# Patient Record
Sex: FEMALE | URBAN - NONMETROPOLITAN AREA
[De-identification: names, ages, dates, MRNs, and addresses within clinical notes are randomized per-mention and may not be internally consistent; named-entity substitution may affect disease eponyms.]

---

## 2021-11-02 ENCOUNTER — APPOINTMENT (RX ONLY)
Dept: URBAN - NONMETROPOLITAN AREA CLINIC 22 | Facility: CLINIC | Age: 66
Setting detail: DERMATOLOGY
End: 2021-11-02

## 2021-11-02 DIAGNOSIS — L72.8 OTHER FOLLICULAR CYSTS OF THE SKIN AND SUBCUTANEOUS TISSUE: ICD-10-CM | Status: INADEQUATELY CONTROLLED

## 2021-11-02 PROBLEM — Z71.89 OTHER SPECIFIED COUNSELING: Status: ACTIVE | Noted: 2021-11-02

## 2021-11-02 PROCEDURE — ? OTHER

## 2021-11-02 PROCEDURE — ? COUNSELING

## 2021-11-02 PROCEDURE — 99203 OFFICE O/P NEW LOW 30 MIN: CPT

## 2021-11-02 PROCEDURE — ? SUNSCREEN RECOMMENDATIONS

## 2021-11-02 ASSESSMENT — LOCATION SIMPLE DESCRIPTION DERM: LOCATION SIMPLE: RIGHT PERINEUM

## 2021-11-02 ASSESSMENT — LOCATION DETAILED DESCRIPTION DERM: LOCATION DETAILED: RIGHT PERINEUM

## 2021-11-02 ASSESSMENT — LOCATION ZONE DERM: LOCATION ZONE: PERINEUM

## 2021-11-02 NOTE — PROCEDURE: OTHER
Render Risk Assessment In Note?: no
Detail Level: Simple
Note Text (......Xxx Chief Complaint.): This diagnosis correlates with the
Other (Free Text): Cancer screening recommendations:\\n- Colon cancer: Colonoscopy every 10 years starting at age 50\\n- Lung cancer: Low dose CT if 55-80 years, a 30 pack-year hx, and current smoker or quit <15 years ago\\n- For women, Cervical cancer: (21-29y) PAP smear every 3 years; (30-65y) PAP with HPV testing every 5 years\\n- For women, Breast cancer: Mammogram every 1-2 years starting at age 40
Other (Free Text): Recommend to come back to have it removed

## 2023-05-19 ENCOUNTER — APPOINTMENT (RX ONLY)
Dept: URBAN - NONMETROPOLITAN AREA CLINIC 19 | Facility: CLINIC | Age: 68
Setting detail: DERMATOLOGY
End: 2023-05-19

## 2023-05-19 VITALS — WEIGHT: 174 LBS | HEIGHT: 69 IN

## 2023-05-19 DIAGNOSIS — Z71.89 OTHER SPECIFIED COUNSELING: ICD-10-CM

## 2023-05-19 DIAGNOSIS — L57.8 OTHER SKIN CHANGES DUE TO CHRONIC EXPOSURE TO NONIONIZING RADIATION: ICD-10-CM

## 2023-05-19 DIAGNOSIS — L81.4 OTHER MELANIN HYPERPIGMENTATION: ICD-10-CM

## 2023-05-19 DIAGNOSIS — D22 MELANOCYTIC NEVI: ICD-10-CM

## 2023-05-19 DIAGNOSIS — L82.1 OTHER SEBORRHEIC KERATOSIS: ICD-10-CM

## 2023-05-19 DIAGNOSIS — L73.8 OTHER SPECIFIED FOLLICULAR DISORDERS: ICD-10-CM

## 2023-05-19 DIAGNOSIS — D485 NEOPLASM OF UNCERTAIN BEHAVIOR OF SKIN: ICD-10-CM

## 2023-05-19 PROBLEM — D48.5 NEOPLASM OF UNCERTAIN BEHAVIOR OF SKIN: Status: ACTIVE | Noted: 2023-05-19

## 2023-05-19 PROBLEM — D22.5 MELANOCYTIC NEVI OF TRUNK: Status: ACTIVE | Noted: 2023-05-19

## 2023-05-19 PROBLEM — D22.72 MELANOCYTIC NEVI OF LEFT LOWER LIMB, INCLUDING HIP: Status: ACTIVE | Noted: 2023-05-19

## 2023-05-19 PROCEDURE — ? COUNSELING

## 2023-05-19 PROCEDURE — ? PRODUCT LINE (ANTI-AGING)

## 2023-05-19 PROCEDURE — 11102 TANGNTL BX SKIN SINGLE LES: CPT

## 2023-05-19 PROCEDURE — ? FULL BODY SKIN EXAM

## 2023-05-19 PROCEDURE — ? PHOTO-DOCUMENTATION

## 2023-05-19 PROCEDURE — 99213 OFFICE O/P EST LOW 20 MIN: CPT | Mod: 25

## 2023-05-19 PROCEDURE — ? BIOPSY BY SHAVE METHOD

## 2023-05-19 PROCEDURE — ? SUNSCREEN RECOMMENDATIONS

## 2023-05-19 ASSESSMENT — LOCATION SIMPLE DESCRIPTION DERM
LOCATION SIMPLE: LEFT PRETIBIAL REGION
LOCATION SIMPLE: UPPER BACK
LOCATION SIMPLE: LEFT CHEEK
LOCATION SIMPLE: CHEST
LOCATION SIMPLE: LEFT UPPER BACK
LOCATION SIMPLE: RIGHT UPPER BACK
LOCATION SIMPLE: RIGHT CHEEK

## 2023-05-19 ASSESSMENT — LOCATION DETAILED DESCRIPTION DERM
LOCATION DETAILED: LEFT INFERIOR MEDIAL UPPER BACK
LOCATION DETAILED: RIGHT CENTRAL MALAR CHEEK
LOCATION DETAILED: RIGHT LATERAL MALAR CHEEK
LOCATION DETAILED: SUPERIOR THORACIC SPINE
LOCATION DETAILED: LEFT MEDIAL SUPERIOR CHEST
LOCATION DETAILED: LEFT LATERAL MALAR CHEEK
LOCATION DETAILED: LEFT PROXIMAL PRETIBIAL REGION
LOCATION DETAILED: RIGHT MEDIAL UPPER BACK

## 2023-05-19 ASSESSMENT — LOCATION ZONE DERM
LOCATION ZONE: TRUNK
LOCATION ZONE: FACE
LOCATION ZONE: LEG

## 2023-05-19 NOTE — PROCEDURE: PRODUCT LINE (ANTI-AGING)
Product 10 Price (In Dollars - Numeric Only, No Special Characters Or $): 0.00
Product 32 Units: 0
Send Charges To Patient Encounter: Yes
Name Of Product 1: 5x retinol
Product 1 Price (In Dollars - Numeric Only, No Special Characters Or $): 92.40
Product 1 Units: 1
Detail Level: Zone
Risk Of Complication Category: No MDM
Assigning Risk Information: Per AMA, level of risk is based upon consequences of the problem(s) addressed at the encounter when appropriately treated. Risk also includes medical decision making related to the need to initiate or forego further testing, treatment and/or hospitalization. Over the counter medication are assigned a risk level of low. Prescription medication management is assigned a risk level of moderate.

## 2024-05-23 ENCOUNTER — APPOINTMENT (RX ONLY)
Dept: URBAN - NONMETROPOLITAN AREA CLINIC 19 | Facility: CLINIC | Age: 69
Setting detail: DERMATOLOGY
End: 2024-05-23

## 2024-05-23 DIAGNOSIS — L72.0 EPIDERMAL CYST: ICD-10-CM

## 2024-05-23 DIAGNOSIS — Z71.89 OTHER SPECIFIED COUNSELING: ICD-10-CM

## 2024-05-23 DIAGNOSIS — L81.4 OTHER MELANIN HYPERPIGMENTATION: ICD-10-CM

## 2024-05-23 DIAGNOSIS — D22 MELANOCYTIC NEVI: ICD-10-CM

## 2024-05-23 DIAGNOSIS — L82.1 OTHER SEBORRHEIC KERATOSIS: ICD-10-CM

## 2024-05-23 DIAGNOSIS — L57.8 OTHER SKIN CHANGES DUE TO CHRONIC EXPOSURE TO NONIONIZING RADIATION: ICD-10-CM

## 2024-05-23 PROBLEM — D22.72 MELANOCYTIC NEVI OF LEFT LOWER LIMB, INCLUDING HIP: Status: ACTIVE | Noted: 2024-05-23

## 2024-05-23 PROBLEM — D22.5 MELANOCYTIC NEVI OF TRUNK: Status: ACTIVE | Noted: 2024-05-23

## 2024-05-23 PROCEDURE — ? FULL BODY SKIN EXAM

## 2024-05-23 PROCEDURE — 99213 OFFICE O/P EST LOW 20 MIN: CPT

## 2024-05-23 PROCEDURE — ? COUNSELING

## 2024-05-23 PROCEDURE — ? SUNSCREEN RECOMMENDATIONS

## 2024-05-23 ASSESSMENT — LOCATION DETAILED DESCRIPTION DERM
LOCATION DETAILED: RIGHT CENTRAL MALAR CHEEK
LOCATION DETAILED: LEFT PROXIMAL PRETIBIAL REGION
LOCATION DETAILED: SUPERIOR THORACIC SPINE
LOCATION DETAILED: RIGHT LATERAL MALAR CHEEK
LOCATION DETAILED: LEFT LATERAL MALAR CHEEK
LOCATION DETAILED: LEFT INFERIOR MEDIAL UPPER BACK
LOCATION DETAILED: LEFT MEDIAL SUPERIOR CHEST

## 2024-05-23 ASSESSMENT — LOCATION SIMPLE DESCRIPTION DERM
LOCATION SIMPLE: RIGHT CHEEK
LOCATION SIMPLE: LEFT CHEEK
LOCATION SIMPLE: LEFT UPPER BACK
LOCATION SIMPLE: UPPER BACK
LOCATION SIMPLE: CHEST
LOCATION SIMPLE: LEFT PRETIBIAL REGION

## 2024-05-23 ASSESSMENT — LOCATION ZONE DERM
LOCATION ZONE: FACE
LOCATION ZONE: LEG
LOCATION ZONE: TRUNK

## 2025-05-22 ENCOUNTER — APPOINTMENT (OUTPATIENT)
Dept: URBAN - NONMETROPOLITAN AREA CLINIC 19 | Facility: CLINIC | Age: 70
Setting detail: DERMATOLOGY
End: 2025-05-22

## 2025-05-22 DIAGNOSIS — L82.1 OTHER SEBORRHEIC KERATOSIS: ICD-10-CM

## 2025-05-22 DIAGNOSIS — L81.4 OTHER MELANIN HYPERPIGMENTATION: ICD-10-CM

## 2025-05-22 DIAGNOSIS — L72.0 EPIDERMAL CYST: ICD-10-CM

## 2025-05-22 DIAGNOSIS — L56.5 DISSEMINATED SUPERFICIAL ACTINIC POROKERATOSIS (DSAP): ICD-10-CM

## 2025-05-22 DIAGNOSIS — Z71.89 OTHER SPECIFIED COUNSELING: ICD-10-CM

## 2025-05-22 DIAGNOSIS — L57.8 OTHER SKIN CHANGES DUE TO CHRONIC EXPOSURE TO NONIONIZING RADIATION: ICD-10-CM

## 2025-05-22 DIAGNOSIS — D22 MELANOCYTIC NEVI: ICD-10-CM

## 2025-05-22 DIAGNOSIS — Z94.4 LIVER TRANSPLANT STATUS: ICD-10-CM

## 2025-05-22 PROBLEM — D22.5 MELANOCYTIC NEVI OF TRUNK: Status: ACTIVE | Noted: 2025-05-22

## 2025-05-22 PROBLEM — D22.72 MELANOCYTIC NEVI OF LEFT LOWER LIMB, INCLUDING HIP: Status: ACTIVE | Noted: 2025-05-22

## 2025-05-22 PROCEDURE — 99213 OFFICE O/P EST LOW 20 MIN: CPT

## 2025-05-22 PROCEDURE — ? COUNSELING

## 2025-05-22 PROCEDURE — ? SUNSCREEN RECOMMENDATIONS

## 2025-05-22 PROCEDURE — ? FULL BODY SKIN EXAM

## 2025-05-22 PROCEDURE — ? DEFER

## 2025-05-22 ASSESSMENT — LOCATION DETAILED DESCRIPTION DERM
LOCATION DETAILED: LEFT PROXIMAL PRETIBIAL REGION
LOCATION DETAILED: RIGHT LATERAL MALAR CHEEK
LOCATION DETAILED: LEFT INFERIOR LATERAL NECK
LOCATION DETAILED: LEFT LATERAL MALAR CHEEK
LOCATION DETAILED: LEFT INFERIOR MEDIAL UPPER BACK
LOCATION DETAILED: LEFT MEDIAL SUPERIOR CHEST
LOCATION DETAILED: SUPERIOR THORACIC SPINE

## 2025-05-22 ASSESSMENT — LOCATION SIMPLE DESCRIPTION DERM
LOCATION SIMPLE: RIGHT CHEEK
LOCATION SIMPLE: LEFT UPPER BACK
LOCATION SIMPLE: LEFT ANTERIOR NECK
LOCATION SIMPLE: LEFT PRETIBIAL REGION
LOCATION SIMPLE: UPPER BACK
LOCATION SIMPLE: CHEST
LOCATION SIMPLE: LEFT CHEEK

## 2025-05-22 ASSESSMENT — LOCATION ZONE DERM
LOCATION ZONE: NECK
LOCATION ZONE: FACE
LOCATION ZONE: LEG
LOCATION ZONE: TRUNK

## 2025-05-22 NOTE — PROCEDURE: MIPS QUALITY
Quality 226: Preventive Care And Screening: Tobacco Use: Screening And Cessation Intervention: Patient screened for tobacco use and is an ex/non-smoker
Detail Level: Generalized
Quality 130: Documentation Of Current Medications In The Medical Record: Current Medications Documented
Quality 47: Advance Care Plan: Advance Care Planning discussed and documented in the medical record; patient did not wish or was not able to name a surrogate decision maker or provide an advance care plan.
Quality 431: Preventive Care And Screening: Unhealthy Alcohol Use - Screening: Patient not identified as an unhealthy alcohol user when screened for unhealthy alcohol use using a systematic screening method